# Patient Record
Sex: FEMALE | Race: WHITE | NOT HISPANIC OR LATINO | URBAN - METROPOLITAN AREA
[De-identification: names, ages, dates, MRNs, and addresses within clinical notes are randomized per-mention and may not be internally consistent; named-entity substitution may affect disease eponyms.]

---

## 2022-08-23 ENCOUNTER — OFFICE VISIT (OUTPATIENT)
Dept: OBSTETRICS AND GYNECOLOGY | Facility: CLINIC | Age: 27
End: 2022-08-23
Payer: MEDICAID

## 2022-08-23 VITALS
WEIGHT: 235.38 LBS | DIASTOLIC BLOOD PRESSURE: 82 MMHG | SYSTOLIC BLOOD PRESSURE: 120 MMHG | HEIGHT: 63 IN | BODY MASS INDEX: 41.71 KG/M2

## 2022-08-23 DIAGNOSIS — N83.201 RIGHT OVARIAN CYST: Primary | ICD-10-CM

## 2022-08-23 DIAGNOSIS — N93.9 ABNORMAL UTERINE BLEEDING: ICD-10-CM

## 2022-08-23 PROCEDURE — 1159F MED LIST DOCD IN RCRD: CPT | Mod: CPTII,S$GLB,, | Performed by: OBSTETRICS & GYNECOLOGY

## 2022-08-23 PROCEDURE — 3008F BODY MASS INDEX DOCD: CPT | Mod: CPTII,S$GLB,, | Performed by: OBSTETRICS & GYNECOLOGY

## 2022-08-23 PROCEDURE — 99204 OFFICE O/P NEW MOD 45 MIN: CPT | Mod: S$GLB,,, | Performed by: OBSTETRICS & GYNECOLOGY

## 2022-08-23 PROCEDURE — 1159F PR MEDICATION LIST DOCUMENTED IN MEDICAL RECORD: ICD-10-PCS | Mod: CPTII,S$GLB,, | Performed by: OBSTETRICS & GYNECOLOGY

## 2022-08-23 PROCEDURE — 3008F PR BODY MASS INDEX (BMI) DOCUMENTED: ICD-10-PCS | Mod: CPTII,S$GLB,, | Performed by: OBSTETRICS & GYNECOLOGY

## 2022-08-23 PROCEDURE — 3079F PR MOST RECENT DIASTOLIC BLOOD PRESSURE 80-89 MM HG: ICD-10-PCS | Mod: CPTII,S$GLB,, | Performed by: OBSTETRICS & GYNECOLOGY

## 2022-08-23 PROCEDURE — 3074F PR MOST RECENT SYSTOLIC BLOOD PRESSURE < 130 MM HG: ICD-10-PCS | Mod: CPTII,S$GLB,, | Performed by: OBSTETRICS & GYNECOLOGY

## 2022-08-23 PROCEDURE — 3074F SYST BP LT 130 MM HG: CPT | Mod: CPTII,S$GLB,, | Performed by: OBSTETRICS & GYNECOLOGY

## 2022-08-23 PROCEDURE — 99204 PR OFFICE/OUTPT VISIT, NEW, LEVL IV, 45-59 MIN: ICD-10-PCS | Mod: S$GLB,,, | Performed by: OBSTETRICS & GYNECOLOGY

## 2022-08-23 PROCEDURE — 3079F DIAST BP 80-89 MM HG: CPT | Mod: CPTII,S$GLB,, | Performed by: OBSTETRICS & GYNECOLOGY

## 2022-08-23 RX ORDER — LIRAGLUTIDE 6 MG/ML
INJECTION SUBCUTANEOUS
COMMUNITY
Start: 2022-08-15

## 2022-08-23 RX ORDER — TRANEXAMIC ACID 650 MG/1
650 TABLET ORAL 3 TIMES DAILY
Qty: 15 TABLET | Refills: 11 | Status: SHIPPED | OUTPATIENT
Start: 2022-08-23 | End: 2022-08-28

## 2022-08-23 RX ORDER — GLIPIZIDE 10 MG/1
10 TABLET ORAL 2 TIMES DAILY
COMMUNITY
Start: 2022-08-15

## 2022-08-23 NOTE — PROGRESS NOTES
"  Gynecology Visit  History & Physical      SUBJECTIVE:     History of Present Illness:  Patient is a 27 y.o. female with a history of autism presents with her maternal grandmother for evaluation for prolonged uterine bleeding. She was referred by Raisa Henry NP. States that she has been bleeding for 28 days. She denies a history of irregular periods. Reports a 5 day monthly menstrual cycle. She has never been sexually active. She has never been able to tolerate a pelvic exam/ Pap smear. Reports that her paternal grandmother passed away from ovarian cancer.       Chief Complaint   Patient presents with    Cyst     Irregular bleeding        Review of patient's allergies indicates:  No Known Allergies    Current Outpatient Medications   Medication Sig Dispense Refill    glipiZIDE (GLUCOTROL) 10 MG tablet Take 10 mg by mouth 2 (two) times daily.      VICTOZA 3-GARY 0.6 mg/0.1 mL (18 mg/3 mL) PnIj pen Inject into the skin.       No current facility-administered medications for this visit.     OB History    No obstetric history on file.     No LMP recorded.      Past Medical History:   Diagnosis Date    Diabetes mellitus      History reviewed. No pertinent surgical history.  History reviewed. No pertinent family history.  Social History     Tobacco Use    Smoking status: Never Smoker    Smokeless tobacco: Never Used   Substance Use Topics    Alcohol use: Not Currently    Drug use: Never        OBJECTIVE:     Vital Signs (Most Recent)  BP: 120/82 (08/23/22 1502)  5' 3" (1.6 m)  106.8 kg (235 lb 6.4 oz)     Physical Exam:  Physical Exam  Vitals reviewed.   Constitutional:       Appearance: Normal appearance. She is obese.   HENT:      Head: Normocephalic and atraumatic.   Pulmonary:      Effort: Pulmonary effort is normal.   Neurological:      General: No focal deficit present.      Mental Status: She is alert and oriented to person, place, and time.   Psychiatric:         Mood and Affect: Mood normal. "         Laboratory 8/18/2022:   TSH 2.65  HCG neg  Hgb: 12.6    Pelvic u/s performed on 8/19/2022:  Uterus measures 7 x 5 x 3.6 cm  Endometrial thickness measures 7 mm  Right ovary contains a 2 cm hemorrhagic cyst  Normal-appearing left ovary    ASSESSMENT/PLAN:       ICD-10-CM ICD-9-CM   1. Right ovarian cyst  N83.201 620.2   2. Abnormal uterine bleeding  N93.9 626.9       PLAN:    --Lysteda ordered to stop acute bleeding.   --Bleeding is likely secondary to hemorrhagic ovarian cyst. Pt denies pain.  --Pt will need follow up u/s in 2 to 3 months to ensure resolution of this cyst.  --If bleeding does not resolve or becomes irregular, consider progesterone IUD placement in the OR. Could perform Pap at that time as well. This would decrease risk of endometrial cancer.   --AUB may be secondary to diabetes.   --Follow-up in 2 weeks.  If bleeding has not stopped or has resumed, will plan for hormone panel. TSH and Hgb wnl.  --Patient and her grandmother agree with care plan and were thankful for care provided today.       Neli Segundo MD   Gynecology    [address]   [address]  [place] [address] [phone]

## 2022-10-25 ENCOUNTER — OFFICE VISIT (OUTPATIENT)
Dept: OBSTETRICS AND GYNECOLOGY | Facility: CLINIC | Age: 27
End: 2022-10-25
Payer: MEDICAID

## 2022-10-25 ENCOUNTER — PROCEDURE VISIT (OUTPATIENT)
Dept: OBSTETRICS AND GYNECOLOGY | Facility: CLINIC | Age: 27
End: 2022-10-25
Payer: MEDICAID

## 2022-10-25 VITALS
BODY MASS INDEX: 41.93 KG/M2 | SYSTOLIC BLOOD PRESSURE: 126 MMHG | DIASTOLIC BLOOD PRESSURE: 84 MMHG | WEIGHT: 236.63 LBS | HEIGHT: 63 IN

## 2022-10-25 DIAGNOSIS — N93.9 ABNORMAL UTERINE BLEEDING (AUB): Primary | ICD-10-CM

## 2022-10-25 DIAGNOSIS — F84.0 AUTISM: ICD-10-CM

## 2022-10-25 DIAGNOSIS — N83.209 OVARIAN CYST: Primary | ICD-10-CM

## 2022-10-25 DIAGNOSIS — E28.2 PCOS (POLYCYSTIC OVARIAN SYNDROME): ICD-10-CM

## 2022-10-25 DIAGNOSIS — N83.201 RIGHT OVARIAN CYST: ICD-10-CM

## 2022-10-25 PROCEDURE — 3079F DIAST BP 80-89 MM HG: CPT | Mod: CPTII,S$GLB,, | Performed by: OBSTETRICS & GYNECOLOGY

## 2022-10-25 PROCEDURE — 76856 US EXAM PELVIC COMPLETE: CPT | Mod: S$GLB,,, | Performed by: OBSTETRICS & GYNECOLOGY

## 2022-10-25 PROCEDURE — 3074F PR MOST RECENT SYSTOLIC BLOOD PRESSURE < 130 MM HG: ICD-10-PCS | Mod: CPTII,S$GLB,, | Performed by: OBSTETRICS & GYNECOLOGY

## 2022-10-25 PROCEDURE — 99214 PR OFFICE/OUTPT VISIT, EST, LEVL IV, 30-39 MIN: ICD-10-PCS | Mod: S$GLB,,, | Performed by: OBSTETRICS & GYNECOLOGY

## 2022-10-25 PROCEDURE — 3079F PR MOST RECENT DIASTOLIC BLOOD PRESSURE 80-89 MM HG: ICD-10-PCS | Mod: CPTII,S$GLB,, | Performed by: OBSTETRICS & GYNECOLOGY

## 2022-10-25 PROCEDURE — 1159F PR MEDICATION LIST DOCUMENTED IN MEDICAL RECORD: ICD-10-PCS | Mod: CPTII,S$GLB,, | Performed by: OBSTETRICS & GYNECOLOGY

## 2022-10-25 PROCEDURE — 99214 OFFICE O/P EST MOD 30 MIN: CPT | Mod: S$GLB,,, | Performed by: OBSTETRICS & GYNECOLOGY

## 2022-10-25 PROCEDURE — 3074F SYST BP LT 130 MM HG: CPT | Mod: CPTII,S$GLB,, | Performed by: OBSTETRICS & GYNECOLOGY

## 2022-10-25 PROCEDURE — 1159F MED LIST DOCD IN RCRD: CPT | Mod: CPTII,S$GLB,, | Performed by: OBSTETRICS & GYNECOLOGY

## 2022-10-25 PROCEDURE — 76856 PR  ECHO,PELVIC (NONOBSTETRIC): ICD-10-PCS | Mod: S$GLB,,, | Performed by: OBSTETRICS & GYNECOLOGY

## 2022-10-25 NOTE — PROGRESS NOTES
"Gynecology Visit     Subjective:       Patient ID: Sherine Leiva is a 27 y.o. female.    Chief Complaint:  Follow-up      History of Present Illness  27-year-old G0 with a h/o autisim presents for follow-up for abnormal uterine bleeding.  She is accompanied by her maternal grandmother. She continues to have irregular bleeding. She is due for follow up u/s today for a 2cm right ovarian hemorrhagic cyst.     GYN & OB History  Patient's last menstrual period was 2022 (exact date).   Date of Last Pap: none    OB History    Para Term  AB Living   0 0 0 0 0 0   SAB IAB Ectopic Multiple Live Births   0 0 0 0 0           /84 (BP Location: Right arm, Patient Position: Sitting)   Ht 5' 3" (1.6 m)   Wt 107.3 kg (236 lb 9.6 oz)   LMP 2022 (Exact Date)   BMI 41.91 kg/m²     Objective:    Physical Exam:   Constitutional: She is oriented to person, place, and time. She appears well-developed and well-nourished.    HENT:   Head: Normocephalic and atraumatic.       Pulmonary/Chest: Effort normal.                      Neurological: She is alert and oriented to person, place, and time.     Psychiatric: She has a normal mood and affect.          Laboratory 2022:   TSH 2.65  HCG neg  Hgb: 12.6     Pelvic u/s performed on 2022:  Uterus measures 7 x 5 x 3.6 cm  Endometrial thickness measures 7 mm  Right ovary contains a 2 cm hemorrhagic cyst  Normal-appearing left ovary    Assessment:        1. Abnormal uterine bleeding (AUB)    2. Right ovarian cyst    3. Autism             Plan:      --Discussed starting oral contraceptives vs oral progesterone to treat abnormal uterine bleeding and decrease risk of endometrial cancer.  --Patient does not want to take OCPs.  --Discussed option of performing a D&C in the OR to assess endometrial tissue and placing a a progesterone IUD at that time.  Will also perform a Pap smear that time.  Patient feels that this is the best option for her.  Her grandmother " agrees.  --Repeat ultrasound ordered to assess for resolution of right ovarian cyst.  --Plan to perform pelvic exam under anesthesia, Pap smear, hysteroscopy/ D&C, and Liletta IUD placement in the OR on Friday, January 6.  --Continue Lysteda as needed until then.   --Follow up for pre-op exam.     Neli Segundo MD   Gynecology    [address]  [address]  [place] [address] [phone]

## 2022-10-26 ENCOUNTER — TELEPHONE (OUTPATIENT)
Dept: OBSTETRICS AND GYNECOLOGY | Facility: CLINIC | Age: 27
End: 2022-10-26
Payer: MEDICAID

## 2022-10-27 ENCOUNTER — TELEPHONE (OUTPATIENT)
Dept: OBSTETRICS AND GYNECOLOGY | Facility: CLINIC | Age: 27
End: 2022-10-27
Payer: MEDICAID

## 2022-10-27 NOTE — PROGRESS NOTES
Ultrasound completed and read     The uterus is normal size measuring 6.7 cm in length with an endometrial thickness is 0.91 cm.  The right ovary has a 1.5 cm simple cyst.  Bilateral follicular cysts suspicious for PCOS

## 2022-11-09 DIAGNOSIS — Z30.014 ENCOUNTER FOR INITIAL PRESCRIPTION OF INTRAUTERINE CONTRACEPTIVE DEVICE (IUD): Primary | ICD-10-CM

## 2022-12-12 ENCOUNTER — OFFICE VISIT (OUTPATIENT)
Dept: OBSTETRICS AND GYNECOLOGY | Facility: CLINIC | Age: 27
End: 2022-12-12
Payer: MEDICAID

## 2022-12-12 VITALS
DIASTOLIC BLOOD PRESSURE: 82 MMHG | SYSTOLIC BLOOD PRESSURE: 126 MMHG | WEIGHT: 238.19 LBS | HEIGHT: 63 IN | BODY MASS INDEX: 42.2 KG/M2

## 2022-12-12 DIAGNOSIS — F84.0 AUTISM: ICD-10-CM

## 2022-12-12 DIAGNOSIS — E28.2 PCOS (POLYCYSTIC OVARIAN SYNDROME): ICD-10-CM

## 2022-12-12 DIAGNOSIS — N93.9 ABNORMAL UTERINE BLEEDING: Primary | ICD-10-CM

## 2022-12-12 DIAGNOSIS — E11.00 TYPE 2 DIABETES MELLITUS WITH HYPEROSMOLARITY WITHOUT COMA, WITHOUT LONG-TERM CURRENT USE OF INSULIN: ICD-10-CM

## 2022-12-12 PROCEDURE — 3079F DIAST BP 80-89 MM HG: CPT | Mod: CPTII,S$GLB,, | Performed by: OBSTETRICS & GYNECOLOGY

## 2022-12-12 PROCEDURE — 1159F MED LIST DOCD IN RCRD: CPT | Mod: CPTII,S$GLB,, | Performed by: OBSTETRICS & GYNECOLOGY

## 2022-12-12 PROCEDURE — 3008F PR BODY MASS INDEX (BMI) DOCUMENTED: ICD-10-PCS | Mod: CPTII,S$GLB,, | Performed by: OBSTETRICS & GYNECOLOGY

## 2022-12-12 PROCEDURE — 1159F PR MEDICATION LIST DOCUMENTED IN MEDICAL RECORD: ICD-10-PCS | Mod: CPTII,S$GLB,, | Performed by: OBSTETRICS & GYNECOLOGY

## 2022-12-12 PROCEDURE — 3074F PR MOST RECENT SYSTOLIC BLOOD PRESSURE < 130 MM HG: ICD-10-PCS | Mod: CPTII,S$GLB,, | Performed by: OBSTETRICS & GYNECOLOGY

## 2022-12-12 PROCEDURE — 3008F BODY MASS INDEX DOCD: CPT | Mod: CPTII,S$GLB,, | Performed by: OBSTETRICS & GYNECOLOGY

## 2022-12-12 PROCEDURE — 3079F PR MOST RECENT DIASTOLIC BLOOD PRESSURE 80-89 MM HG: ICD-10-PCS | Mod: CPTII,S$GLB,, | Performed by: OBSTETRICS & GYNECOLOGY

## 2022-12-12 PROCEDURE — 99214 PR OFFICE/OUTPT VISIT, EST, LEVL IV, 30-39 MIN: ICD-10-PCS | Mod: S$GLB,,, | Performed by: OBSTETRICS & GYNECOLOGY

## 2022-12-12 PROCEDURE — 3074F SYST BP LT 130 MM HG: CPT | Mod: CPTII,S$GLB,, | Performed by: OBSTETRICS & GYNECOLOGY

## 2022-12-12 PROCEDURE — 99214 OFFICE O/P EST MOD 30 MIN: CPT | Mod: S$GLB,,, | Performed by: OBSTETRICS & GYNECOLOGY

## 2022-12-12 NOTE — PROGRESS NOTES
"Gynecology Preoperative Exam  History & Physical      SUBJECTIVE:     History of Present Illness:  Patient is a 27 y.o. female presents with her maternal guardian for her preoperative exam for exam under anesthesia with Pap smear screening and hysteroscopy/D&C with Liletta IUD placement for abnormal uterine bleeding in the setting of PCOS and autism.    CC: AUB    Review of patient's allergies indicates:  No Known Allergies    Current Outpatient Medications   Medication Sig Dispense Refill    glipiZIDE (GLUCOTROL) 10 MG tablet Take 10 mg by mouth 2 (two) times daily.      VICTOZA 3-GARY 0.6 mg/0.1 mL (18 mg/3 mL) PnIj pen Inject into the skin.       No current facility-administered medications for this visit.     OB History          0    Para   0    Term   0       0    AB   0    Living   0         SAB   0    IAB   0    Ectopic   0    Multiple   0    Live Births   0             Patient's last menstrual period was 2022 (exact date).      Past Medical History:   Diagnosis Date    Diabetes mellitus      History reviewed. No pertinent surgical history.  No family history on file.  Social History     Tobacco Use    Smoking status: Never    Smokeless tobacco: Never   Substance Use Topics    Alcohol use: Not Currently    Drug use: Never        OBJECTIVE:     Vital Signs (Most Recent)  BP: 126/82 (22 1430)  5' 3" (1.6 m)  108 kg (238 lb 3.2 oz)     Physical Exam:  Physical Exam  Vitals reviewed.   Constitutional:       Appearance: Normal appearance.   HENT:      Head: Normocephalic and atraumatic.   Pulmonary:      Effort: Pulmonary effort is normal.   Neurological:      General: No focal deficit present.      Mental Status: She is alert and oriented to person, place, and time.   Psychiatric:         Mood and Affect: Mood normal.         Laboratory 2022:   TSH 2.65  HCG neg  Hgb: 12.6    Pelvic u/s performed on 10/25/2022:  Uterus measures 6.7 x 5.2 x 2.9 cm  Endometrial thickness measures 9.1 " mm  Right ovary contains a 1.6 cm simple cyst  Both ovaries are consistent with PCOS    ASSESSMENT/PLAN:       ICD-10-CM ICD-9-CM   1. Abnormal uterine bleeding  N93.9 626.9   2. PCOS (polycystic ovarian syndrome)  E28.2 256.4   3. Autism  F84.0 299.00   4. Type 2 diabetes mellitus with hyperosmolarity without coma, without long-term current use of insulin  E11.00 250.20       PLAN:    --Patient has failed OCPs in the past.  She desires Liletta IUD S treatment for her abnormal uterine bleeding and to decrease her risk of endometrial hyperplasia/cancer in the setting of PCOS, obesity, and type 2 diabetes.  --She does not tolerate exams in the office given her autism.  --Plan to perform an exam under anesthesia with Pap smear screening while in the OR.  Will also perform hysteroscopy and D&C to assess for other possible causes of abnormal uterine bleeding and screened for hyperplasia/cancer.  --Procedure is scheduled for January 6, 2023 at OSH.   --Follow up in the office 4 weeks after procedure or sooner as needed.       Neli Segundo MD   Gynecology    [address]    [address]  [place] [address] [phone]

## 2023-01-05 ENCOUNTER — TELEPHONE (OUTPATIENT)
Dept: OBSTETRICS AND GYNECOLOGY | Facility: CLINIC | Age: 28
End: 2023-01-05
Payer: MEDICAID

## 2023-01-05 NOTE — TELEPHONE ENCOUNTER
----- Message from Azra Gomez sent at 1/5/2023  7:50 AM CST -----  Contact: pt  Patient Mother Is Asking For A Ball Back She Has Questions About Patient Eating Before Her Surgery ...  Please give a call back [phone]

## 2023-02-06 ENCOUNTER — OFFICE VISIT (OUTPATIENT)
Dept: OBSTETRICS AND GYNECOLOGY | Facility: CLINIC | Age: 28
End: 2023-02-06
Payer: MEDICAID

## 2023-02-06 VITALS
SYSTOLIC BLOOD PRESSURE: 126 MMHG | DIASTOLIC BLOOD PRESSURE: 84 MMHG | WEIGHT: 238 LBS | HEIGHT: 63 IN | BODY MASS INDEX: 42.17 KG/M2

## 2023-02-06 DIAGNOSIS — N93.9 ABNORMAL UTERINE BLEEDING (AUB): Primary | ICD-10-CM

## 2023-02-06 DIAGNOSIS — E28.2 PCOS (POLYCYSTIC OVARIAN SYNDROME): ICD-10-CM

## 2023-02-06 DIAGNOSIS — F84.0 AUTISM: ICD-10-CM

## 2023-02-06 PROCEDURE — 1159F MED LIST DOCD IN RCRD: CPT | Mod: CPTII,S$GLB,, | Performed by: OBSTETRICS & GYNECOLOGY

## 2023-02-06 PROCEDURE — 3074F SYST BP LT 130 MM HG: CPT | Mod: CPTII,S$GLB,, | Performed by: OBSTETRICS & GYNECOLOGY

## 2023-02-06 PROCEDURE — 3079F DIAST BP 80-89 MM HG: CPT | Mod: CPTII,S$GLB,, | Performed by: OBSTETRICS & GYNECOLOGY

## 2023-02-06 PROCEDURE — 3008F BODY MASS INDEX DOCD: CPT | Mod: CPTII,S$GLB,, | Performed by: OBSTETRICS & GYNECOLOGY

## 2023-02-06 PROCEDURE — 1159F PR MEDICATION LIST DOCUMENTED IN MEDICAL RECORD: ICD-10-PCS | Mod: CPTII,S$GLB,, | Performed by: OBSTETRICS & GYNECOLOGY

## 2023-02-06 PROCEDURE — 3008F PR BODY MASS INDEX (BMI) DOCUMENTED: ICD-10-PCS | Mod: CPTII,S$GLB,, | Performed by: OBSTETRICS & GYNECOLOGY

## 2023-02-06 PROCEDURE — 3079F PR MOST RECENT DIASTOLIC BLOOD PRESSURE 80-89 MM HG: ICD-10-PCS | Mod: CPTII,S$GLB,, | Performed by: OBSTETRICS & GYNECOLOGY

## 2023-02-06 PROCEDURE — 99214 PR OFFICE/OUTPT VISIT, EST, LEVL IV, 30-39 MIN: ICD-10-PCS | Mod: S$GLB,,, | Performed by: OBSTETRICS & GYNECOLOGY

## 2023-02-06 PROCEDURE — 99214 OFFICE O/P EST MOD 30 MIN: CPT | Mod: S$GLB,,, | Performed by: OBSTETRICS & GYNECOLOGY

## 2023-02-06 PROCEDURE — 3074F PR MOST RECENT SYSTOLIC BLOOD PRESSURE < 130 MM HG: ICD-10-PCS | Mod: CPTII,S$GLB,, | Performed by: OBSTETRICS & GYNECOLOGY

## 2023-02-06 NOTE — PROGRESS NOTES
"Gynecology Preoperative Exam  History & Physical      SUBJECTIVE:     History of Present Illness:  Patient is a 28 y.o. female presents with her guardian for her preoperative exam for exam under anesthesia with Pap smear screening and hysteroscopy/D&C with Liletta IUD placement for abnormal uterine bleeding in the setting of PCOS and autism.    Chief Complaint   Patient presents with    Pre-op Exam       Review of patient's allergies indicates:  No Known Allergies    Current Outpatient Medications   Medication Sig Dispense Refill    glipiZIDE (GLUCOTROL) 10 MG tablet Take 10 mg by mouth 2 (two) times daily.      VICTOZA 3-GARY 0.6 mg/0.1 mL (18 mg/3 mL) PnIj pen Inject into the skin.       No current facility-administered medications for this visit.     OB History          0    Para   0    Term   0       0    AB   0    Living   0         SAB   0    IAB   0    Ectopic   0    Multiple   0    Live Births   0             Patient's last menstrual period was 2023 (approximate).      Past Medical History:   Diagnosis Date    Diabetes mellitus      History reviewed. No pertinent surgical history.  History reviewed. No pertinent family history.  Social History     Tobacco Use    Smoking status: Never    Smokeless tobacco: Never   Substance Use Topics    Alcohol use: Not Currently    Drug use: Never        OBJECTIVE:     Vital Signs (Most Recent)  BP: 126/84 (23 1441)  5' 3" (1.6 m)  108 kg (238 lb)     Physical Exam:  Physical Exam  Vitals reviewed.   Constitutional:       Appearance: Normal appearance.   HENT:      Head: Normocephalic and atraumatic.   Pulmonary:      Effort: Pulmonary effort is normal.   Neurological:      General: No focal deficit present.      Mental Status: She is alert and oriented to person, place, and time.   Psychiatric:         Mood and Affect: Mood normal.         Behavior: Behavior normal.         Laboratory 2022:   TSH 2.65  HCG neg  Hgb: 12.6     Pelvic u/s " performed on 10/25/2022:  Uterus measures 6.7 x 5.2 x 2.9 cm  Endometrial thickness measures 9.1 mm  Right ovary contains a 1.6 cm simple cyst  Both ovaries are consistent with PCOS    ASSESSMENT/PLAN:       ICD-10-CM ICD-9-CM   1. Abnormal uterine bleeding (AUB)  N93.9 626.9   2. PCOS (polycystic ovarian syndrome)  E28.2 256.4   3. Autism  F84.0 299.00       PLAN:  --Patient has failed OCPs in the past.  She desires Liletta IUD S treatment for her abnormal uterine bleeding and to decrease her risk of endometrial hyperplasia/cancer in the setting of PCOS, obesity, and type 2 diabetes.  --She does not tolerate exams in the office given her autism.  --Plan to perform an exam under anesthesia with Pap smear screening while in the OR.  Will also perform hysteroscopy and D&C to assess for other possible causes of abnormal uterine bleeding and screened for hyperplasia/cancer.  --Procedure was initially scheduled for January 6, 2023 at OSH, but pt missed her preop exam at the hospital. Procedure has been rescheduled for Feb 24.   --Follow up in the office 4 weeks after procedure or sooner as needed.       Neli Segundo MD   Gynecology    [address]  [address]  [place] [address] [phone]

## 2023-02-16 ENCOUNTER — TELEPHONE (OUTPATIENT)
Dept: OBSTETRICS AND GYNECOLOGY | Facility: CLINIC | Age: 28
End: 2023-02-16
Payer: MEDICAID

## 2023-02-24 ENCOUNTER — OUTSIDE PLACE OF SERVICE (OUTPATIENT)
Dept: OBSTETRICS AND GYNECOLOGY | Facility: CLINIC | Age: 28
End: 2023-02-24

## 2023-02-24 PROCEDURE — 58558 PR HYSTEROSCOPY,W/ENDO BX: ICD-10-PCS | Mod: ,,, | Performed by: OBSTETRICS & GYNECOLOGY

## 2023-02-24 PROCEDURE — 58558 HYSTEROSCOPY BIOPSY: CPT | Mod: ,,, | Performed by: OBSTETRICS & GYNECOLOGY

## 2023-02-24 PROCEDURE — 58300 PR INSERT INTRAUTERINE DEVICE: ICD-10-PCS | Mod: 51,,, | Performed by: OBSTETRICS & GYNECOLOGY

## 2023-02-24 PROCEDURE — 58300 INSERT INTRAUTERINE DEVICE: CPT | Mod: 51,,, | Performed by: OBSTETRICS & GYNECOLOGY

## 2023-03-14 ENCOUNTER — OFFICE VISIT (OUTPATIENT)
Dept: OBSTETRICS AND GYNECOLOGY | Facility: CLINIC | Age: 28
End: 2023-03-14
Payer: MEDICAID

## 2023-03-14 VITALS
HEIGHT: 63 IN | BODY MASS INDEX: 43.94 KG/M2 | SYSTOLIC BLOOD PRESSURE: 124 MMHG | DIASTOLIC BLOOD PRESSURE: 82 MMHG | WEIGHT: 248 LBS

## 2023-03-14 DIAGNOSIS — N85.02 COMPLEX ATYPICAL ENDOMETRIAL HYPERPLASIA: Primary | ICD-10-CM

## 2023-03-14 PROCEDURE — 3079F DIAST BP 80-89 MM HG: CPT | Mod: CPTII,S$GLB,, | Performed by: OBSTETRICS & GYNECOLOGY

## 2023-03-14 PROCEDURE — 1159F MED LIST DOCD IN RCRD: CPT | Mod: CPTII,S$GLB,, | Performed by: OBSTETRICS & GYNECOLOGY

## 2023-03-14 PROCEDURE — 3008F BODY MASS INDEX DOCD: CPT | Mod: CPTII,S$GLB,, | Performed by: OBSTETRICS & GYNECOLOGY

## 2023-03-14 PROCEDURE — 3074F PR MOST RECENT SYSTOLIC BLOOD PRESSURE < 130 MM HG: ICD-10-PCS | Mod: CPTII,S$GLB,, | Performed by: OBSTETRICS & GYNECOLOGY

## 2023-03-14 PROCEDURE — 99024 PR POST-OP FOLLOW-UP VISIT: ICD-10-PCS | Mod: S$GLB,,, | Performed by: OBSTETRICS & GYNECOLOGY

## 2023-03-14 PROCEDURE — 1159F PR MEDICATION LIST DOCUMENTED IN MEDICAL RECORD: ICD-10-PCS | Mod: CPTII,S$GLB,, | Performed by: OBSTETRICS & GYNECOLOGY

## 2023-03-14 PROCEDURE — 3079F PR MOST RECENT DIASTOLIC BLOOD PRESSURE 80-89 MM HG: ICD-10-PCS | Mod: CPTII,S$GLB,, | Performed by: OBSTETRICS & GYNECOLOGY

## 2023-03-14 PROCEDURE — 99024 POSTOP FOLLOW-UP VISIT: CPT | Mod: S$GLB,,, | Performed by: OBSTETRICS & GYNECOLOGY

## 2023-03-14 PROCEDURE — 3074F SYST BP LT 130 MM HG: CPT | Mod: CPTII,S$GLB,, | Performed by: OBSTETRICS & GYNECOLOGY

## 2023-03-14 PROCEDURE — 3008F PR BODY MASS INDEX (BMI) DOCUMENTED: ICD-10-PCS | Mod: CPTII,S$GLB,, | Performed by: OBSTETRICS & GYNECOLOGY

## 2023-03-14 NOTE — PROGRESS NOTES
"Gynecology Visit     Subjective:       Patient ID: Sherine Leiva is a 28 y.o. female.    Chief Complaint:  Post-op Evaluation      History of Present Illness  29yo G0 presents for her post op appointment following hysteroscopy/ D&C with progesterone IUD placement for AUB. She denies heavy bleeding, but endorses cramps.     GYN & OB History  No LMP recorded.   2023: Pap showed atypical glandular cells    OB History    Para Term  AB Living   0 0 0 0 0 0   SAB IAB Ectopic Multiple Live Births   0 0 0 0 0           /82 (BP Location: Left arm, Patient Position: Sitting, BP Method: Medium (Manual))   Ht 5' 3" (1.6 m)   Wt 112.5 kg (248 lb)   BMI 43.93 kg/m²     Objective:    Physical Exam:   Constitutional: She is oriented to person, place, and time. She appears well-developed and well-nourished.    HENT:   Head: Normocephalic and atraumatic.       Pulmonary/Chest: Effort normal.                      Neurological: She is alert and oriented to person, place, and time.            Pathology from D&C showed: complex atypical hyperplasia     Pelvic u/s performed on 10/25/2022:  Uterus measures 6.7 x 5.2 x 2.9 cm  Endometrial thickness measures 9.1 mm  Right ovary contains a 1.6 cm simple cyst  Both ovaries are consistent with PCOS    Assessment:        1. Complex atypical endometrial hyperplasia             Plan:      --Pt was referred to Dr. Landaverde for hysterectomy in case of concurrent cancer and need for staging at time of surgery.   --She has an appointment scheduled with him on .   --She and her family are thankful for care provided.   --Follow up after hysterectomy.     Neli Segundo MD   Gynecology    [address] [facility]   [address]  [place] [address] [phone]     "